# Patient Record
Sex: MALE | URBAN - METROPOLITAN AREA
[De-identification: names, ages, dates, MRNs, and addresses within clinical notes are randomized per-mention and may not be internally consistent; named-entity substitution may affect disease eponyms.]

---

## 2023-12-20 ENCOUNTER — TELEPHONE (OUTPATIENT)
Age: 63
End: 2023-12-20

## 2023-12-20 DIAGNOSIS — R97.20 ELEVATED PSA: Primary | ICD-10-CM

## 2023-12-20 NOTE — TELEPHONE ENCOUNTER
Former patient of  is transferring his care to . He is wondering if we can place a PSA order for him to have done prior to his February apt. He would like this mailed to his home.       ADDRESS:  [address]   [place] [address]     Please call patient to make him aware that this was sent.     CB: [phone]

## 2024-01-26 ENCOUNTER — TELEPHONE (OUTPATIENT)
Dept: UROLOGY | Facility: CLINIC | Age: 64
End: 2024-01-26

## 2024-01-26 RX ORDER — TADALAFIL 5 MG/1
5 TABLET ORAL DAILY PRN
COMMUNITY
Start: 2023-11-13 | End: 2024-02-01 | Stop reason: SDUPTHER

## 2024-01-26 NOTE — TELEPHONE ENCOUNTER
Left message for patient to call the office back to let us know if he had his PSA drawn and also to see who his PCP is. Awaiting return call. Also sent message to see about getting imaging up in PACS. Sent fax to [facility] for records as well.

## 2024-01-29 NOTE — TELEPHONE ENCOUNTER
Pt calling back to answer questions:  He will be getting PSA done tomorrow.    PCP is Dr. Kory Obando                [address]                [place] [id number] [phone]    Pt state you can call back if you have further questions

## 2024-01-31 PROBLEM — N40.1 BENIGN PROSTATIC HYPERPLASIA WITH NOCTURIA: Status: ACTIVE | Noted: 2024-01-31

## 2024-01-31 PROBLEM — R35.1 BENIGN PROSTATIC HYPERPLASIA WITH NOCTURIA: Status: ACTIVE | Noted: 2024-01-31

## 2024-01-31 PROBLEM — D17.71 RENAL ANGIOMYOLIPOMA: Status: ACTIVE | Noted: 2024-01-31

## 2024-02-01 ENCOUNTER — TELEPHONE (OUTPATIENT)
Age: 64
End: 2024-02-01

## 2024-02-01 ENCOUNTER — OFFICE VISIT (OUTPATIENT)
Dept: UROLOGY | Facility: CLINIC | Age: 64
End: 2024-02-01
Payer: COMMERCIAL

## 2024-02-01 VITALS
DIASTOLIC BLOOD PRESSURE: 70 MMHG | RESPIRATION RATE: 20 BRPM | BODY MASS INDEX: 30.34 KG/M2 | HEIGHT: 74 IN | WEIGHT: 236.4 LBS | OXYGEN SATURATION: 98 % | SYSTOLIC BLOOD PRESSURE: 102 MMHG | HEART RATE: 54 BPM | TEMPERATURE: 97.2 F

## 2024-02-01 DIAGNOSIS — D17.71 RENAL ANGIOMYOLIPOMA: Primary | ICD-10-CM

## 2024-02-01 DIAGNOSIS — N40.1 BENIGN PROSTATIC HYPERPLASIA WITH NOCTURIA: ICD-10-CM

## 2024-02-01 DIAGNOSIS — R35.1 BENIGN PROSTATIC HYPERPLASIA WITH NOCTURIA: ICD-10-CM

## 2024-02-01 LAB
SL AMB  POCT GLUCOSE, UA: NEGATIVE
SL AMB LEUKOCYTE ESTERASE,UA: NEGATIVE
SL AMB POCT BILIRUBIN,UA: NEGATIVE
SL AMB POCT BLOOD,UA: ABNORMAL
SL AMB POCT CLARITY,UA: CLEAR
SL AMB POCT COLOR,UA: YELLOW
SL AMB POCT KETONES,UA: NEGATIVE
SL AMB POCT NITRITE,UA: NEGATIVE
SL AMB POCT PH,UA: 6
SL AMB POCT SPECIFIC GRAVITY,UA: 1.01
SL AMB POCT URINE PROTEIN: NEGATIVE
SL AMB POCT UROBILINOGEN: 0.2

## 2024-02-01 PROCEDURE — 99214 OFFICE O/P EST MOD 30 MIN: CPT | Performed by: UROLOGY

## 2024-02-01 PROCEDURE — 81003 URINALYSIS AUTO W/O SCOPE: CPT | Performed by: UROLOGY

## 2024-02-01 RX ORDER — TADALAFIL 5 MG/1
5 TABLET ORAL DAILY PRN
Qty: 90 TABLET | Refills: 3 | Status: SHIPPED | OUTPATIENT
Start: 2024-02-01 | End: 2025-01-26

## 2024-02-01 RX ORDER — SILDENAFIL 50 MG/1
100 TABLET, FILM COATED ORAL DAILY PRN
Qty: 20 TABLET | Refills: 5 | Status: SHIPPED | OUTPATIENT
Start: 2024-02-01

## 2024-02-01 NOTE — TELEPHONE ENCOUNTER
PA for MACIEL BPH    Submitted via    [][employer]-Case ID # Case ID: [id number]        Office notes sent, clinical questions answered. Awaiting determination

## 2024-02-01 NOTE — PROGRESS NOTES
UROLOGY PROGRESS NOTE         NAME: Rich Lee  AGE: 63 y.o. SEX: male  : 1960   MRN: [id number]    DATE: 2024  TIME: 11:09 AM    Assessment and Plan      Impression:   1. Renal angiomyolipoma  -     POCT urine dip auto non-scope  -     PSA Total, Diagnostic; Future  -     US kidney and bladder; Future; Expected date: 2024  -     US kidney and bladder; Future; Expected date: 2024    2. Benign prostatic hyperplasia with nocturia  -     PSA Total, Diagnostic; Future  -     US kidney and bladder; Future; Expected date: 2024  -     US kidney and bladder; Future; Expected date: 2024         Plan: Patient doing well with voiding.  He is going to continue his tadalafil.  5 mg daily.  I am ordering a renal ultrasound for follow-up of his angiomyolipoma.  Barring any significant issues or change, I will see him back in 1 year with a repeat ultrasound and PSA prior.  He will call us if he has any difficulties urologically.  Patient primarily uses his tadalafil for BPH.  He knows to discontinue this for 3 -4 days prior to taking sildenafil which he uses for sexual function.  He understands that he should not take both medications together.  He also understands the risks of these medications along with her interactions which have been discussed.      Chief Complaint   No chief complaint on file.    History of Present Illness     HPI: Rich Lee is a 63 y.o. year old male who presents with a history of BPH on tadalafil 5 mg daily and an angiomyolipoma which is followed annually.  This has been stable at approximately 4 cm on the previous ultrasounds and imaging.  Patient last seen by KIRA Chan at [facility] 1 year ago.  PSAs have been satisfactory and stable as well.  PSA a few days ago was 2.53              The following portions of the patient's history were reviewed and updated as appropriate: allergies, current medications, past family history, past medical history, past social  "history, past surgical history and problem list.  Past Medical History:   Diagnosis Date    GERD (gastroesophageal reflux disease)     MVP (mitral valve prolapse)      History reviewed. No pertinent surgical history.  shoulder  Review of Systems     Const: Denies chills, fever and weight loss.  CV: Denies chest pain.  Resp: Denies SOB.  GI: Denies abdominal pain, nausea and vomiting.  : Denies symptoms other than stated above.  Musculo: Denies back pain.    Objective   /70   Pulse (!) 54   Temp (!) 97.2 °F (36.2 °C)   Resp 20   Ht 6' 2\" (1.88 m)   Wt 107 kg (236 lb 6.4 oz)   SpO2 98%   BMI 30.35 kg/m²     Physical Exam  Const: Appears healthy and well developed. No signs of acute distress present.  Resp: Respirations are regular and unlabored.   CV: Rate is regular. Rhythm is regular.  Abdomen: Abdomen is soft, nontender, and nondistended. Kidneys are not palpable.  : Prostate is 3+ smooth soft unchanged and there is no nodule.  Psych: Patient's attitude is cooperative. Mood is normal. Affect is normal.    Procedure   Procedures     Current Medications     Current Outpatient Medications:     OMEPRAZOLE PO, Start: 12/14/23 14:17:00 EST, See Instructions, Disp# 90 cap, Refills: 3, TAKE 1 CAPSULE DAILY, Pharmacy: [facility], Disp: , Rfl:     tadalafil (CIALIS) 5 MG tablet, Take 5 mg by mouth daily as needed, Disp: , Rfl:         Wade Burt MD        "

## 2024-11-14 ENCOUNTER — TELEPHONE (OUTPATIENT)
Age: 64
End: 2024-11-14

## 2024-11-14 NOTE — TELEPHONE ENCOUNTER
Patient called today to ask that his PSA and Ultrasound orders be updated. Orders will  on 25 and patient wants to have them done closer to appt date.     Pt is scheduled in next avail with Dr Burt on  at 9:45 AM for one year follow up due around 24.    Pt was provided the tel number for CS and asks that both US and PSA orders be mailed to confirmed home address of:    [address]  [place] [address]

## 2024-11-14 NOTE — TELEPHONE ENCOUNTER
PSA lab order is good until 2025, US order does not  until 2028    Clerical can you please print these orders and mail to patient per request, thank you

## 2024-12-19 ENCOUNTER — TELEPHONE (OUTPATIENT)
Age: 64
End: 2024-12-19

## 2024-12-19 NOTE — TELEPHONE ENCOUNTER
Patient of Dr. Burt, last seen 2/1/24 had PSA done last week at [place] and saw PCP today.    PSA 4.29, increased from previous.    Patient will have PCP office fax results.     Patient has office visit scheduled for January 28th 2025 at 3:30pm with Dr. Burt.     #[phone]

## 2025-01-27 ENCOUNTER — TELEPHONE (OUTPATIENT)
Dept: UROLOGY | Facility: CLINIC | Age: 65
End: 2025-01-27

## 2025-01-27 RX ORDER — DOXYCYCLINE 100 MG/1
100 CAPSULE ORAL AS NEEDED
COMMUNITY
Start: 2024-12-19

## 2025-01-27 RX ORDER — HYDROCORTISONE VALERATE CREAM 2 MG/G
0.2 CREAM TOPICAL AS NEEDED
COMMUNITY

## 2025-01-27 RX ORDER — FLUOCINONIDE TOPICAL SOLUTION USP, 0.05% 0.5 MG/ML
0.05 SOLUTION TOPICAL AS NEEDED
COMMUNITY
Start: 2024-12-10

## 2025-01-27 NOTE — TELEPHONE ENCOUNTER
Ena Barba's Office called back regarding the elevated PSA and obtained the appropriate fax number to provide the results.

## 2025-01-27 NOTE — TELEPHONE ENCOUNTER
Encounter on 12/19/24 states elevated PSA 4.29. Most recent lab on 01/21/2025 shows PSA of 2.96. Contacted PCP to see where 4.29 PSA lab work is

## 2025-01-29 ENCOUNTER — OFFICE VISIT (OUTPATIENT)
Dept: UROLOGY | Facility: CLINIC | Age: 65
End: 2025-01-29
Payer: COMMERCIAL

## 2025-01-29 ENCOUNTER — TELEPHONE (OUTPATIENT)
Age: 65
End: 2025-01-29

## 2025-01-29 VITALS
OXYGEN SATURATION: 98 % | TEMPERATURE: 97.4 F | HEART RATE: 85 BPM | WEIGHT: 235 LBS | SYSTOLIC BLOOD PRESSURE: 132 MMHG | HEIGHT: 74 IN | BODY MASS INDEX: 30.16 KG/M2 | DIASTOLIC BLOOD PRESSURE: 72 MMHG

## 2025-01-29 DIAGNOSIS — D17.71 RENAL ANGIOMYOLIPOMA: ICD-10-CM

## 2025-01-29 DIAGNOSIS — R35.1 BENIGN PROSTATIC HYPERPLASIA WITH NOCTURIA: Primary | ICD-10-CM

## 2025-01-29 DIAGNOSIS — N40.1 BENIGN PROSTATIC HYPERPLASIA WITH NOCTURIA: Primary | ICD-10-CM

## 2025-01-29 DIAGNOSIS — N52.9 ERECTILE DYSFUNCTION, UNSPECIFIED ERECTILE DYSFUNCTION TYPE: ICD-10-CM

## 2025-01-29 LAB
SL AMB  POCT GLUCOSE, UA: ABNORMAL
SL AMB LEUKOCYTE ESTERASE,UA: ABNORMAL
SL AMB POCT BILIRUBIN,UA: ABNORMAL
SL AMB POCT BLOOD,UA: ABNORMAL
SL AMB POCT CLARITY,UA: CLEAR
SL AMB POCT COLOR,UA: YELLOW
SL AMB POCT KETONES,UA: ABNORMAL
SL AMB POCT NITRITE,UA: ABNORMAL
SL AMB POCT PH,UA: 6
SL AMB POCT SPECIFIC GRAVITY,UA: 1.01
SL AMB POCT URINE PROTEIN: ABNORMAL
SL AMB POCT UROBILINOGEN: 0.2

## 2025-01-29 PROCEDURE — 81003 URINALYSIS AUTO W/O SCOPE: CPT | Performed by: UROLOGY

## 2025-01-29 PROCEDURE — 99214 OFFICE O/P EST MOD 30 MIN: CPT | Performed by: UROLOGY

## 2025-01-29 RX ORDER — TADALAFIL 5 MG/1
5 TABLET ORAL DAILY PRN
Qty: 90 TABLET | Refills: 3 | Status: SHIPPED | OUTPATIENT
Start: 2025-01-29

## 2025-01-29 RX ORDER — SILDENAFIL CITRATE 20 MG/1
20 TABLET ORAL DAILY PRN
Qty: 60 TABLET | Refills: 3 | Status: SHIPPED | OUTPATIENT
Start: 2025-01-29

## 2025-01-29 NOTE — TELEPHONE ENCOUNTER
PA for SILDENAFIL 20 MG SUBMITTED to [employer]    via      [x][employer]-Case ID #  [id number]     [x]PA sent as URGENT    All office notes, labs and other pertaining documents and studies sent. Clinical questions answered. Awaiting determination from insurance company.     Turnaround time for your insurance to make a decision on your Prior Authorization can take 7-21 business days.

## 2025-02-17 ENCOUNTER — RESULTS FOLLOW-UP (OUTPATIENT)
Dept: UROLOGY | Facility: CLINIC | Age: 65
End: 2025-02-17

## 2025-02-17 DIAGNOSIS — D17.71 RENAL ANGIOMYOLIPOMA: Primary | ICD-10-CM

## 2025-02-17 NOTE — RESULT ENCOUNTER NOTE
Patient has a 3 cm angiomyolipoma of the kidney.  This is stable.  Follow-up renal ultrasound in 6 to 12 months.

## 2025-02-17 NOTE — TELEPHONE ENCOUNTER
Patient returned missed call. Message was relayed.    Pt asks that a message be sent to him via [email or web address] so that he can remember to schedule the US in 6-12 months.

## 2025-02-17 NOTE — TELEPHONE ENCOUNTER
----- Message from Torrey Burt MD sent at 2/17/2025  1:39 AM EST -----  Patient has a 3 cm angiomyolipoma of the kidney.  This is stable.  Follow-up renal ultrasound in 6 to 12 months.

## 2025-02-19 NOTE — TELEPHONE ENCOUNTER
Sent message via the [employer] regarding a reminder for his US in 6-12 months. Spoke with patient to let him know that we do send reminders to schedule US. I did send a copy of his US order to him as requested.

## 2025-06-02 ENCOUNTER — TELEPHONE (OUTPATIENT)
Age: 65
End: 2025-06-02

## 2025-06-02 DIAGNOSIS — R35.1 BENIGN PROSTATIC HYPERPLASIA WITH NOCTURIA: ICD-10-CM

## 2025-06-02 DIAGNOSIS — N40.1 BENIGN PROSTATIC HYPERPLASIA WITH NOCTURIA: ICD-10-CM

## 2025-06-02 RX ORDER — SILDENAFIL 50 MG/1
100 TABLET, FILM COATED ORAL DAILY PRN
Qty: 20 TABLET | Refills: 0 | Status: SHIPPED | OUTPATIENT
Start: 2025-06-02

## 2025-06-02 NOTE — TELEPHONE ENCOUNTER
PA for SILDENAFIL 50 MG  DENIED    Reason:(Screenshot if applicable)  DX OF BPH FOR SILDENAFIL 50 MG IS NOT COVERED BY PLAN    PT CAN USE [employer] OPTION

## 2025-08-22 ENCOUNTER — TELEPHONE (OUTPATIENT)
Age: 65
End: 2025-08-22